# Patient Record
Sex: FEMALE
[De-identification: names, ages, dates, MRNs, and addresses within clinical notes are randomized per-mention and may not be internally consistent; named-entity substitution may affect disease eponyms.]

---

## 2019-12-27 ENCOUNTER — APPOINTMENT (OUTPATIENT)
Dept: OTOLARYNGOLOGY | Facility: CLINIC | Age: 24
End: 2019-12-27
Payer: COMMERCIAL

## 2019-12-27 VITALS
DIASTOLIC BLOOD PRESSURE: 67 MMHG | HEIGHT: 61 IN | HEART RATE: 74 BPM | SYSTOLIC BLOOD PRESSURE: 115 MMHG | WEIGHT: 128 LBS | BODY MASS INDEX: 24.17 KG/M2

## 2019-12-27 DIAGNOSIS — R05 COUGH: ICD-10-CM

## 2019-12-27 DIAGNOSIS — Z87.09 PERSONAL HISTORY OF OTHER DISEASES OF THE RESPIRATORY SYSTEM: ICD-10-CM

## 2019-12-27 DIAGNOSIS — K21.9 GASTRO-ESOPHAGEAL REFLUX DISEASE W/OUT ESOPHAGITIS: ICD-10-CM

## 2019-12-27 DIAGNOSIS — Z87.01 PERSONAL HISTORY OF PNEUMONIA (RECURRENT): ICD-10-CM

## 2019-12-27 DIAGNOSIS — J01.01 ACUTE RECURRENT MAXILLARY SINUSITIS: ICD-10-CM

## 2019-12-27 PROBLEM — Z00.00 ENCOUNTER FOR PREVENTIVE HEALTH EXAMINATION: Status: ACTIVE | Noted: 2019-12-27

## 2019-12-27 PROCEDURE — 31231 NASAL ENDOSCOPY DX: CPT

## 2019-12-27 PROCEDURE — 99203 OFFICE O/P NEW LOW 30 MIN: CPT | Mod: 25

## 2019-12-27 NOTE — REVIEW OF SYSTEMS
[Post Nasal Drip] : post nasal drip [Nasal Congestion] : nasal congestion [Hoarseness] : hoarseness [Throat Clearing] : throat clearing [Feeling Poorly] : feeling poorly [Throat Pain] : throat pain [Feeling Tired] : feeling tired [Chest Pain] : chest pain [Shortness Of Breath] : shortness of breath [Cough] : cough [SOB on Exertion] : shortness of breath during exertion [Heartburn] : heartburn

## 2019-12-27 NOTE — HISTORY OF PRESENT ILLNESS
[de-identified] : ELIGIO FIERRO is a 24 year woman with a history of recurrent sinus infections starting about 4 years ago. Typically she gets purulent nasal drainage, cough associated with frontal pain. She has had several episodes of bronchitis and recently pneumonia for which she took Azithromycin then Cefpodoxime. She feels she has an infection now

## 2019-12-27 NOTE — ASSESSMENT
[FreeTextEntry1] : ELIGIO YOUNGBLOOD has acute sinus infection and bronchitis. I will call with culture and treat.\par She has moderate LPR which I believe is driving her infections. I suggested and discussed in detail a strict reflux diet and gave the patient a handout.\par I am recommending Prilosec 20 mg 30-40 minutes before breakfast on an empty stomach.\par I am recommending Pepcid 20mg  before bedtime.\par \par She will get allergy evaluation in [place].\par \par

## 2020-01-03 LAB — BACTERIA FLD CULT: NORMAL

## 2020-01-06 RX ORDER — SULFAMETHOXAZOLE AND TRIMETHOPRIM 800; 160 MG/1; MG/1
800-160 TABLET ORAL TWICE DAILY
Qty: 20 | Refills: 1 | Status: ACTIVE | COMMUNITY
Start: 2020-01-06 | End: 1900-01-01